# Patient Record
Sex: MALE | Race: ASIAN | URBAN - METROPOLITAN AREA
[De-identification: names, ages, dates, MRNs, and addresses within clinical notes are randomized per-mention and may not be internally consistent; named-entity substitution may affect disease eponyms.]

---

## 2018-06-17 ENCOUNTER — RADIANT APPOINTMENT (OUTPATIENT)
Dept: GENERAL RADIOLOGY | Facility: CLINIC | Age: 38
End: 2018-06-17
Attending: PEDIATRICS
Payer: COMMERCIAL

## 2018-06-17 ENCOUNTER — OFFICE VISIT (OUTPATIENT)
Dept: URGENT CARE | Facility: URGENT CARE | Age: 38
End: 2018-06-17
Payer: COMMERCIAL

## 2018-06-17 VITALS
SYSTOLIC BLOOD PRESSURE: 111 MMHG | DIASTOLIC BLOOD PRESSURE: 64 MMHG | RESPIRATION RATE: 16 BRPM | WEIGHT: 134.9 LBS | BODY MASS INDEX: 23.16 KG/M2 | OXYGEN SATURATION: 96 % | TEMPERATURE: 100.5 F | HEART RATE: 90 BPM

## 2018-06-17 DIAGNOSIS — R05.9 COUGH: ICD-10-CM

## 2018-06-17 DIAGNOSIS — R05.9 COUGH: Primary | ICD-10-CM

## 2018-06-17 PROCEDURE — 99213 OFFICE O/P EST LOW 20 MIN: CPT | Performed by: PEDIATRICS

## 2018-06-17 PROCEDURE — 71046 X-RAY EXAM CHEST 2 VIEWS: CPT | Mod: FY

## 2018-06-17 NOTE — MR AVS SNAPSHOT
After Visit Summary   6/17/2018    Rosey Novoa    MRN: [id number]           Patient Information     Date Of Birth          1980        Visit Information        Provider Department      6/17/2018 11:05 AM Sajan Delacruz MD [facility]        Today's Diagnoses     Cough    -  1       Follow-ups after your visit        Who to contact     If you have questions or need follow up information about today's clinic visit or your schedule please contact [facility] directly at [phone].  Normal or non-critical lab and imaging results will be communicated to you by MyChart, letter or phone within 4 business days after the clinic has received the results. If you do not hear from us within 7 days, please contact the clinic through MyChart or phone. If you have a critical or abnormal lab result, we will notify you by phone as soon as possible.  Submit refill requests through [employer] or call your pharmacy and they will forward the refill request to us. Please allow 3 business days for your refill to be completed.          Additional Information About Your Visit        Care EveryWhere ID     This is your Care EveryWhere ID. This could be used by other organizations to access your [facility] medical records  [id number]        Your Vitals Were     Pulse Temperature Respirations Pulse Oximetry BMI (Body Mass Index)       90 100.5  F (38.1  C) (Oral) 16 96% 23.16 kg/m2        Blood Pressure from Last 3 Encounters:   06/17/18 111/64   12/02/16 104/66   11/03/16 118/78    Weight from Last 3 Encounters:   06/17/18 134 lb 14.4 oz (61.2 kg)   12/02/16 149 lb 11.2 oz (67.9 kg)   11/03/16 148 lb (67.1 kg)                 Today's Medication Changes          These changes are accurate as of 6/17/18 12:32 PM.  If you have any questions, ask your nurse or doctor.               Start taking these medicines.        Dose/Directions    amoxicillin-clavulanate 875-125 MG per  tablet   Commonly known as:  AUGMENTIN   Used for:  Cough   Started by:  Sajan Delacruz MD        Dose:  1 tablet   Take 1 tablet by mouth 2 times daily   Quantity:  20 tablet   Refills:  0            Where to get your medicines      These medications were sent to [id number] - [place], [place] - [address] AT [facility] [address], [place] [address]     Phone:  [phone]     amoxicillin-clavulanate 875-125 MG per tablet                Primary Care Provider    None Specified       No primary provider on file.        Equal Access to Services     [facility]: Hadii aad ku hadasho Soomaali, waaxda luqadaha, qaybta kaalmada adeegyada, kvng sandoval . So [facility] [phone].    ATENCIÓN: Si habla español, tiene a zuluaga disposición servicios gratuitos de asistencia lingüística. [facility] [phone].    We comply with applicable federal civil rights laws and [place] laws. We do not discriminate on the basis of race, color, national origin, age, disability, sex, sexual orientation, or gender identity.            Thank you!     Thank you for choosing [facility]  for your care. Our goal is always to provide you with excellent care. Hearing back from our patients is one way we can continue to improve our services. Please take a few minutes to complete the written survey that you may receive in the mail after your visit with us. Thank you!             Your Updated Medication List - Protect others around you: Learn how to safely use, store and throw away your medicines at www.disposemymeds.org.          This list is accurate as of 6/17/18 12:32 PM.  Always use your most recent med list.                   Brand Name Dispense Instructions for use Diagnosis    amoxicillin-clavulanate 875-125 MG per tablet    AUGMENTIN    20 tablet    Take 1 tablet by mouth 2 times daily    Cough       benzonatate 100 MG capsule    TESSALON    30  capsule    Take 1 capsule (100 mg) by mouth 3 times daily as needed for cough    URI (upper respiratory infection)       fluticasone 50 MCG/ACT spray    FLONASE    1 Package    Spray 1-2 sprays into both nostrils every evening    URI (upper respiratory infection)       ibuprofen 800 MG tablet    ADVIL/MOTRIN    30 tablet    Take 1 tablet (800 mg) by mouth every 8 hours as needed for moderate pain    Plantar fasciitis, Heel pain, bilateral       TYLENOL PO

## 2018-06-17 NOTE — PROGRESS NOTES
SUBJECTIVE:  Rosey Novoa is a 37 year old male here with concerns about sinus infection.  He states onset of symptoms were 3 day(s) ago.  He has had maxillary, frontal pressure. Course of illness is same. Severity mild  Current and Associated symptoms: nasal congestion, rhinorrhea, cough  and sore throat  Predisposing factors include recent sick contacts. Recent treatment has included: None    No past medical history on file.  Social History   Substance Use Topics     Smoking status: Never Smoker     Smokeless tobacco: Never Used     Alcohol use Not on file       ROS:  INTEGUMENTARY/SKIN: NEGATIVE for worrisome rashes, moles or lesions  EYES: NEGATIVE for vision changes or irritation  CV: NEGATIVE for chest pain, palpitations or peripheral edema  GI: NEGATIVE for nausea, abdominal pain, heartburn, or change in bowel habits  MUSCULOSKELETAL: NEGATIVE for significant arthralgias or myalgia  NEURO: NEGATIVE for weakness, dizziness or paresthesias    OBJECTIVE:  /64  Pulse 90  Temp 100.5  F (38.1  C) (Oral)  Resp 16  Wt 134 lb 14.4 oz (61.2 kg)  SpO2 96%  BMI 23.16 kg/m2  Exam:GENERAL APPEARANCE: healthy, alert and no distress  EYES: EOMI,  PERRL, conjunctiva clear  HENT: ear canals and TM's normal.  Nose and mouth without ulcers, erythema or lesions  NECK: supple, nontender, no lymphadenopathy  RESP: mild crackles at LL lung base  CV: regular rates and rhythm, normal S1 S2, no murmur noted  ABDOMEN:  soft, nontender, no HSM or masses and bowel sounds normal  NEURO: Normal strength and tone, sensory exam grossly normal,  normal speech and mentation  SKIN: no suspicious lesions or rashes    CXR neg on my read    ASSESSMENT:  Sinusitis  See diagnosis    PLAN:  See orders. Start augmentin if not improving by day 7 of illness  Follow up with primary clinic if not improving